# Patient Record
Sex: FEMALE | Race: WHITE | NOT HISPANIC OR LATINO | Employment: OTHER | URBAN - METROPOLITAN AREA
[De-identification: names, ages, dates, MRNs, and addresses within clinical notes are randomized per-mention and may not be internally consistent; named-entity substitution may affect disease eponyms.]

---

## 2017-02-28 ENCOUNTER — HISTORICAL (OUTPATIENT)
Dept: LAB | Facility: HOSPITAL | Age: 57
End: 2017-02-28

## 2017-03-06 ENCOUNTER — HISTORICAL (OUTPATIENT)
Dept: ADMINISTRATIVE | Facility: HOSPITAL | Age: 57
End: 2017-03-06

## 2017-07-25 ENCOUNTER — HISTORICAL (OUTPATIENT)
Dept: ENDOSCOPY | Facility: HOSPITAL | Age: 57
End: 2017-07-25

## 2018-07-24 ENCOUNTER — HISTORICAL (OUTPATIENT)
Dept: ENDOSCOPY | Facility: HOSPITAL | Age: 58
End: 2018-07-24

## 2019-08-27 ENCOUNTER — HISTORICAL (OUTPATIENT)
Dept: ENDOSCOPY | Facility: HOSPITAL | Age: 59
End: 2019-08-27

## 2020-12-15 ENCOUNTER — HISTORICAL (OUTPATIENT)
Dept: CARDIOLOGY | Facility: HOSPITAL | Age: 60
End: 2020-12-15

## 2022-04-10 ENCOUNTER — HISTORICAL (OUTPATIENT)
Dept: ADMINISTRATIVE | Facility: HOSPITAL | Age: 62
End: 2022-04-10

## 2022-04-28 VITALS
WEIGHT: 173.31 LBS | BODY MASS INDEX: 30.71 KG/M2 | SYSTOLIC BLOOD PRESSURE: 138 MMHG | DIASTOLIC BLOOD PRESSURE: 80 MMHG | HEIGHT: 63 IN | OXYGEN SATURATION: 98 %

## 2022-04-30 NOTE — OP NOTE
DATE OF SURGERY:    07/25/2017    SURGEON:  Sid Resendez MD    attending physician:  Sid Resendez MD    PREOPERATIVE DIAGNOSIS:  History of transitional cell carcinoma of the bladder.    POSTOPERATIVE DIAGNOSIS:  History of transitional cell carcinoma of the bladder.    PROCEDURE:  Cystoscopy.    ANESTHESIA:  None.    BLOOD LOSS:  None.    COMPLICATIONS:  None.    FINDINGS:  Scar right posterior wall just off the trigone.  No evidence recurrence.    INDICATION:  A 57-year-old female with a history of transitional cell carcinoma, superficial T1 disease of her bladder.  She has not had any recurrences.  She did not receive BCG.  She comes back in for surveillance.    DESCRIPTION OF PROCEDURE:  A FISH specimen was submitted before the procedure.  She was then placed in dorsal lithotomy, prepped with Betadine.  Viscous lidocaine instilled into the urethra.  Flexible scope was inserted.  Urethra was unremarkable.  Looking in the bladder, ureteral orifices normal size, shape, and position.  There is some scarring off the right trigone from her initial biopsy site.  No evidence of any recurrent disease.  No areas of erythema or abnormal mucosa noted.  Dome and bladder neck were all viewed and free of lesions.      ASSESSMENT:  History of transitional cell carcinoma with no evidence recurrence.  I will see her back in 1 year for surveillance cystoscopy, sooner for problems.        ______________________________  Sid Resendez MD    TAB/MODL  DD:  07/25/2017  Time:  11:44AM  DT:  07/25/2017  Time:  12:03PM  Job #:  [id number]

## 2022-04-30 NOTE — OP NOTE
UHDS (Unverified)  DISCHARGE DATE:  07/24/2018    PREOPERATIVE DIAGNOSIS:  History of transitional cell carcinoma of the bladder.    POSTOPERATIVE DIAGNOSIS:  History of transitional cell carcinoma of the bladder.    PROCEDURE:  Cystoscopy.    ESTIMATED BLOOD LOSS:  None.    COMPLICATIONS:  None.    FINDINGS:  No evidence of recurrence.    INDICATIONS FOR PROCEDURE:  A 58-year-old female, history of transitional cell carcinoma.  She had superficial T1 disease.  She has not had any recurrences and is currently on a yearly surveillance cycle.  Of note, she never received BCG and has not had any recurrences.    DESCRIPTION OF PROCEDURE:  She was taken to the cystoscopy suite.  She was placed in dorsal lithotomy and prepped with Betadine.  Viscous lidocaine was instilled into the urethra.  Flexible scope was inserted.  Urethra was unremarkable.  Looking in the bladder, ureteral orifices were normal size, shape, and position. Clear efflux bilaterally.  No tumor, stones, or foreign bodies seen.  Dome and bladder neck viewed and free of lesions.  She did have lateral cystoceles.  Otherwise unremarkable examination with no evidence of recurrence.  I am going to see her back in 1 year for a surveillance cystoscopy, sooner should she have any problems.        ______________________________  Sid Resendez MD    TAB/MODL  DD:  07/24/2018  Time:  11:38AM  DT:  07/24/2018  Time:  11:46AM  Job #:  [id number]       Result type: Discharge Summary  Result date: July 24, 2018 11:46 CDT  Result status: Unauth  Result title: UHDS  Performed by: Sid Resendez MD on July 24, 2018 11:38 CDT  Encounter info: [id number], [facility], Day Surgery, 7/24/2018 - 7/24/2018  Contributor system: [employer]    Doc has been moved by HIM specialist to the correct doc type.

## 2022-04-30 NOTE — OP NOTE
DATE OF SURGERY:        SURGEON:  Sid Resendez MD    attending physician:  Dr. Sid Resendez MD    PREOPERATIVE DIAGNOSIS:  History of transitional cell carcinoma of the bladder.    POSTOPERATIVE DIAGNOSIS:  History of transitional cell carcinoma of the bladder.    PROCEDURE:  Cystoscopy.    COMPLICATIONS:  None.    BLOOD LOSS:  None.    FINDINGS:  No evidence of recurrent disease.    INDICATIONS:  A 59-year-old female with history of transitional carcinoma.  Initially, she had a T1 lesion.  She was managed with surveillance, currently on yearly surveillance regime.  She denies any hematuria or voiding issues.  An FISH test was submitted before the procedure.    PROCEDURE IN DETAIL:  Informed consent was obtained.  She was placed in dorsal lithotomy position, prepped with Betadine.  Viscous lidocaine instilled in the urethra.  Flexible scope was inserted.  There were no tumor, stones or foreign bodies seen.  Ureteral orifices normal size, shape, position, clear efflux bilaterally.  Dome and bladder neck reviewed and free of lesions.  She has cystoceles bilateral, otherwise no significant findings.  No evidence of recurrence.  I will see her back in a year for surveillance cystoscopy, sooner should she have any problems.        ______________________________  Sid Resendez MD    TAB/MODL  DD:  09/03/2019  Time:  11:16AM  DT:  09/03/2019  Time:  11:22AM  Job #:  [id number]

## 2022-11-21 ENCOUNTER — OFFICE VISIT (OUTPATIENT)
Dept: UROLOGY | Facility: CLINIC | Age: 62
End: 2022-11-21
Payer: MEDICAID

## 2022-11-21 VITALS
WEIGHT: 168.63 LBS | TEMPERATURE: 98 F | SYSTOLIC BLOOD PRESSURE: 118 MMHG | DIASTOLIC BLOOD PRESSURE: 78 MMHG | HEART RATE: 72 BPM | RESPIRATION RATE: 20 BRPM | HEIGHT: 64 IN | OXYGEN SATURATION: 100 % | BODY MASS INDEX: 28.79 KG/M2

## 2022-11-21 DIAGNOSIS — Z85.51 HISTORY OF BLADDER CANCER: Primary | ICD-10-CM

## 2022-11-21 PROCEDURE — 3078F PR MOST RECENT DIASTOLIC BLOOD PRESSURE < 80 MM HG: ICD-10-PCS | Mod: CPTII,,, | Performed by: UROLOGY

## 2022-11-21 PROCEDURE — 3074F SYST BP LT 130 MM HG: CPT | Mod: CPTII,,, | Performed by: UROLOGY

## 2022-11-21 PROCEDURE — 3008F BODY MASS INDEX DOCD: CPT | Mod: CPTII,,, | Performed by: UROLOGY

## 2022-11-21 PROCEDURE — 3074F PR MOST RECENT SYSTOLIC BLOOD PRESSURE < 130 MM HG: ICD-10-PCS | Mod: CPTII,,, | Performed by: UROLOGY

## 2022-11-21 PROCEDURE — 1159F MED LIST DOCD IN RCRD: CPT | Mod: CPTII,,, | Performed by: UROLOGY

## 2022-11-21 PROCEDURE — 99213 PR OFFICE/OUTPT VISIT, EST, LEVL III, 20-29 MIN: ICD-10-PCS | Mod: S$PBB,,, | Performed by: UROLOGY

## 2022-11-21 PROCEDURE — 1160F RVW MEDS BY RX/DR IN RCRD: CPT | Mod: CPTII,,, | Performed by: UROLOGY

## 2022-11-21 PROCEDURE — 3078F DIAST BP <80 MM HG: CPT | Mod: CPTII,,, | Performed by: UROLOGY

## 2022-11-21 PROCEDURE — 1160F PR REVIEW ALL MEDS BY PRESCRIBER/CLIN PHARMACIST DOCUMENTED: ICD-10-PCS | Mod: CPTII,,, | Performed by: UROLOGY

## 2022-11-21 PROCEDURE — 99214 OFFICE O/P EST MOD 30 MIN: CPT | Mod: PBBFAC | Performed by: UROLOGY

## 2022-11-21 PROCEDURE — 1159F PR MEDICATION LIST DOCUMENTED IN MEDICAL RECORD: ICD-10-PCS | Mod: CPTII,,, | Performed by: UROLOGY

## 2022-11-21 PROCEDURE — 88108 CYTOPATH CONCENTRATE TECH: CPT | Performed by: UROLOGY

## 2022-11-21 PROCEDURE — 99213 OFFICE O/P EST LOW 20 MIN: CPT | Mod: S$PBB,,, | Performed by: UROLOGY

## 2022-11-21 PROCEDURE — 3008F PR BODY MASS INDEX (BMI) DOCUMENTED: ICD-10-PCS | Mod: CPTII,,, | Performed by: UROLOGY

## 2022-11-21 RX ORDER — GABAPENTIN 600 MG/1
900 TABLET ORAL 3 TIMES DAILY
COMMUNITY

## 2022-11-21 RX ORDER — INSULIN GLARGINE 100 [IU]/ML
INJECTION, SOLUTION SUBCUTANEOUS NIGHTLY
COMMUNITY

## 2022-11-21 RX ORDER — LATANOPROST 50 UG/ML
1 SOLUTION/ DROPS OPHTHALMIC NIGHTLY
COMMUNITY

## 2022-11-21 RX ORDER — DICYCLOMINE HYDROCHLORIDE 20 MG/1
20 TABLET ORAL EVERY 6 HOURS
COMMUNITY

## 2022-11-21 RX ORDER — PEN NEEDLE, DIABETIC, SAFETY 30 GX3/16"
NEEDLE, DISPOSABLE MISCELLANEOUS
COMMUNITY

## 2022-11-21 RX ORDER — PRAVASTATIN SODIUM 20 MG/1
40 TABLET ORAL NIGHTLY
COMMUNITY

## 2022-11-21 RX ORDER — DOCUSATE SODIUM 250 MG
250 CAPSULE ORAL DAILY
COMMUNITY

## 2022-11-21 RX ORDER — AMITRIPTYLINE HYDROCHLORIDE 25 MG/1
25 TABLET, FILM COATED ORAL NIGHTLY
COMMUNITY

## 2022-11-21 RX ORDER — BUDESONIDE AND FORMOTEROL FUMARATE DIHYDRATE 80; 4.5 UG/1; UG/1
2 AEROSOL RESPIRATORY (INHALATION)
COMMUNITY

## 2022-11-21 RX ORDER — ACETAMINOPHEN AND CODEINE PHOSPHATE 300; 60 MG/1; MG/1
1 TABLET ORAL 3 TIMES DAILY
COMMUNITY
Start: 2022-09-13

## 2022-11-21 RX ORDER — CYCLOSPORINE 0.5 MG/ML
1 EMULSION OPHTHALMIC 2 TIMES DAILY
COMMUNITY

## 2022-11-21 RX ORDER — OXYBUTYNIN CHLORIDE 5 MG/1
5 TABLET, EXTENDED RELEASE ORAL
COMMUNITY

## 2022-11-21 RX ORDER — AMLODIPINE BESYLATE 10 MG/1
10 TABLET ORAL
COMMUNITY

## 2022-11-21 RX ORDER — PEN NEEDLE, DIABETIC 30 GX3/16"
NEEDLE, DISPOSABLE MISCELLANEOUS
COMMUNITY
Start: 2021-12-16

## 2022-11-21 RX ORDER — INSULIN ASPART 100 [IU]/ML
INJECTION, SOLUTION INTRAVENOUS; SUBCUTANEOUS
COMMUNITY

## 2022-11-21 RX ORDER — DULOXETIN HYDROCHLORIDE 60 MG/1
60 CAPSULE, DELAYED RELEASE ORAL
COMMUNITY

## 2022-11-21 RX ORDER — PANTOPRAZOLE SODIUM 40 MG/1
40 TABLET, DELAYED RELEASE ORAL DAILY
COMMUNITY

## 2022-11-21 RX ORDER — GLIMEPIRIDE 4 MG/1
8 TABLET ORAL
COMMUNITY

## 2022-11-21 RX ORDER — LUBIPROSTONE 24 UG/1
24 CAPSULE ORAL 2 TIMES DAILY
COMMUNITY

## 2022-11-21 RX ORDER — SIMVASTATIN 20 MG/1
20 TABLET, FILM COATED ORAL NIGHTLY
COMMUNITY

## 2022-11-21 RX ORDER — FLUTICASONE PROPIONATE 50 MCG
1 SPRAY, SUSPENSION (ML) NASAL NIGHTLY
COMMUNITY

## 2022-11-21 RX ORDER — FAMOTIDINE 40 MG/1
40 TABLET, FILM COATED ORAL NIGHTLY PRN
COMMUNITY

## 2022-11-21 NOTE — PROGRESS NOTES
CC:  Yearly    HPI:  Lina Bobo is a 62 y.o. female here for follow-up of bladder cancer.  She has a history of a TURBT on 13 June 2014 with Dr. Samuel Doty in [place].  The pathology showed noninvasive low-grade papillary urothelial carcinoma.  On that specimen there was no muscle present so a repeat biopsy was done which showed a small area of noninvasive low-grade papillary urothelial carcinoma.  There was no cancer in the muscle.  She had a cystoscopy in November of 2021 and that was negative.  She has no urinary complaints today.  She has not had upper tract imaging.    Pathology:  See HPI.    Data Review:  Operative note from Dr. Samuel Doty dated 13 June 2014.  A second note from the same source dated 3 October 2014.  Pathology report as above.  ROS:  All systems reviewed and are negative except as documented in HPI and/or Assessment and Plan.     Patient Active Problem List:     There is no problem list on file for this patient.       Past Medical History:  History reviewed. No pertinent past medical history.     Past Surgical History:  History reviewed. No pertinent surgical history.     Family History:  History reviewed. No pertinent family history.     Social History:  Social History     Socioeconomic History    Marital status: Single   Tobacco Use    Smoking status: Some Days     Packs/day: 0.50     Types: Cigarettes     Passive exposure: Past    Smokeless tobacco: Never        Allergies:  Review of patient's allergies indicates:   Allergen Reactions    Lisinopril     Pcn [penicillins]     Sulfa (sulfonamide antibiotics)     Toradol [ketorolac]     Ultram [tramadol]         Objective:  Vitals:    11/21/22 1455   BP: 118/78   Pulse: 72   Resp: 20   Temp: 98.2 °F (36.8 °C)     General:  Well developed, well nourished adult female in no acute distress  Abdomen: Soft, nontender, no masses  Extremities:  No clubbing, cyanosis, or edema  Neurologic:  Grossly intact  Musculoskeletal:  Normal  tone    Assessment:  1. History of bladder cancer  - Cytology, Urine  - CT Abdomen With Without Contrast; Future  - Creatinine, serum; Future     Plan:  Urine cytology sent today.  Will get a CT urogram for upper tract imaging.  Schedule for cystoscopy.    Follow-up:    For cystoscopy after CT urogram.

## 2022-11-21 NOTE — PROGRESS NOTES
Placed in room. Seen by Dr. Lamb. Spoke with patient. Will obtain urine and send to cytology. RTC next available cysto.

## 2022-11-22 ENCOUNTER — LAB VISIT (OUTPATIENT)
Dept: LAB | Facility: HOSPITAL | Age: 62
End: 2022-11-22
Attending: FAMILY MEDICINE
Payer: MEDICAID

## 2022-11-22 DIAGNOSIS — Z85.51 HISTORY OF BLADDER CANCER: ICD-10-CM

## 2022-11-22 PROCEDURE — 36415 COLL VENOUS BLD VENIPUNCTURE: CPT

## 2022-11-23 LAB
ESTROGEN SERPL-MCNC: NORMAL PG/ML
INSULIN SERPL-ACNC: NORMAL U[IU]/ML
LAB AP CLINICAL INFORMATION: NORMAL
LAB AP GROSS DESCRIPTION: NORMAL

## 2022-12-20 ENCOUNTER — HOSPITAL ENCOUNTER (OUTPATIENT)
Dept: RADIOLOGY | Facility: HOSPITAL | Age: 62
Discharge: HOME | End: 2022-12-20
Attending: UROLOGY
Payer: MEDICAID

## 2022-12-20 DIAGNOSIS — Z85.51 HISTORY OF BLADDER CANCER: ICD-10-CM

## 2022-12-20 LAB
CREAT SERPL-MCNC: 1.04 MG/DL (ref 0.55–1.02)
GFR SERPLBLD CREATININE-BSD FMLA CKD-EPI: >60 MLS/MIN/1.73/M2

## 2022-12-20 PROCEDURE — 82565 ASSAY OF CREATININE: CPT | Performed by: UROLOGY

## 2022-12-20 PROCEDURE — 74178 CT ABD&PLV WO CNTR FLWD CNTR: CPT | Mod: TC

## 2022-12-20 PROCEDURE — 25500020 PHARM REV CODE 255: Performed by: UROLOGY

## 2022-12-20 RX ADMIN — IOPAMIDOL 100 ML: 755 INJECTION, SOLUTION INTRAVENOUS at 10:12

## 2023-06-12 ENCOUNTER — PROCEDURE VISIT (OUTPATIENT)
Dept: UROLOGY | Facility: CLINIC | Age: 63
End: 2023-06-12
Payer: MEDICAID

## 2023-06-12 VITALS
RESPIRATION RATE: 20 BRPM | HEIGHT: 63 IN | BODY MASS INDEX: 30.72 KG/M2 | TEMPERATURE: 98 F | WEIGHT: 173.38 LBS | SYSTOLIC BLOOD PRESSURE: 135 MMHG | DIASTOLIC BLOOD PRESSURE: 83 MMHG | HEART RATE: 99 BPM | OXYGEN SATURATION: 95 %

## 2023-06-12 DIAGNOSIS — Z85.51 HISTORY OF BLADDER CANCER: Primary | ICD-10-CM

## 2023-06-12 DIAGNOSIS — N28.1 RENAL CYST: ICD-10-CM

## 2023-06-12 LAB
BILIRUB SERPL-MCNC: NORMAL MG/DL
BLOOD URINE, POC: NORMAL
COLOR, POC UA: NORMAL
GLUCOSE UR QL STRIP: NORMAL
KETONES UR QL STRIP: NORMAL
LEUKOCYTE ESTERASE URINE, POC: NORMAL
NITRITE, POC UA: NORMAL
PH, POC UA: 6.5
PROTEIN, POC: NORMAL
SPECIFIC GRAVITY, POC UA: 1.01
UROBILINOGEN, POC UA: 0.2

## 2023-06-12 PROCEDURE — 99212 PR OFFICE/OUTPT VISIT, EST, LEVL II, 10-19 MIN: ICD-10-PCS | Mod: 25,S$PBB,, | Performed by: UROLOGY

## 2023-06-12 PROCEDURE — 88108 CYTOPATH CONCENTRATE TECH: CPT | Mod: TC | Performed by: UROLOGY

## 2023-06-12 PROCEDURE — 52000 CYSTOURETHROSCOPY: CPT | Mod: PBBFAC | Performed by: UROLOGY

## 2023-06-12 PROCEDURE — 52000 CYSTOURETHROSCOPY: CPT | Mod: S$PBB,,, | Performed by: UROLOGY

## 2023-06-12 PROCEDURE — 52000 PR CYSTOURETHROSCOPY: ICD-10-PCS | Mod: S$PBB,,, | Performed by: UROLOGY

## 2023-06-12 PROCEDURE — 81001 URINALYSIS AUTO W/SCOPE: CPT | Mod: PBBFAC | Performed by: UROLOGY

## 2023-06-12 PROCEDURE — 99212 OFFICE O/P EST SF 10 MIN: CPT | Mod: 25,S$PBB,, | Performed by: UROLOGY

## 2023-06-12 RX ORDER — DULAGLUTIDE 1.5 MG/.5ML
1.5 INJECTION, SOLUTION SUBCUTANEOUS WEEKLY
COMMUNITY
Start: 2023-06-02

## 2023-06-12 RX ORDER — CIPROFLOXACIN 500 MG/1
500 TABLET ORAL
Status: COMPLETED | OUTPATIENT
Start: 2023-06-12 | End: 2023-06-12

## 2023-06-12 RX ORDER — LIDOCAINE HYDROCHLORIDE 20 MG/ML
JELLY TOPICAL
Status: COMPLETED | OUTPATIENT
Start: 2023-06-12 | End: 2023-06-12

## 2023-06-12 RX ADMIN — LIDOCAINE HYDROCHLORIDE: 20 JELLY TOPICAL at 01:06

## 2023-06-12 RX ADMIN — CIPROFLOXACIN 500 MG: 500 TABLET ORAL at 01:06

## 2023-06-12 NOTE — PROCEDURES
CC:  Cystoscopy    HPI:  Lina Bobo is a 62 y.o. female here for cystoscopy for history of bladder cancer.  She has a history of a TURBT on 2014 with Dr. Samuel Doty in [place].  The pathology showed noninvasive low-grade papillary urothelial carcinoma.  On that specimen there was no muscle present so a repeat biopsy was done which showed a small area of noninvasive low-grade papillary urothelial carcinoma.  There was no cancer in the muscle.  She had a cystoscopy in 2021 and that was negative and this will be her first cystoscopy since then.    Urinalysis:  Results for orders placed or performed in visit on 23   POCT URINE DIPSTICK WITH MICROSCOPE, AUTOMATED   Result Value Ref Range    Color, UA Light Yellow     Spec Grav UA 1.015     pH, UA 6.5     WBC, UA neg     Nitrite, UA neg     Protein, POC neg     Glucose, UA neg     Ketones, UA neg     Urobilinogen, UA 0.2     Bilirubin, POC neg     Blood, UA neg      Microscopic:  Negative    Lab Results:  Cytology - 2022: Negative    Imaging:  CT - 2022:    -  Cortical scarring in the upper pole of the left kidney which stable from the previous imaging.    -  Bilateral simple cysts.       ROS:  All systems reviewed and are negative except as documented in HPI and/or Assessment and Plan.     Patient Active Problem List:     There is no problem list on file for this patient.       Past Medical History:  Past Medical History:   Diagnosis Date    Bladder cancer     Diabetes mellitus     Heartburn     Hypertension     Mixed hyperlipidemia         Past Surgical History:  Past Surgical History:   Procedure Laterality Date    CARPAL TUNNEL RELEASE Bilateral      SECTION      x 2    HYSTERECTOMY          Family History:  History reviewed. No pertinent family history.     Social History:  Social History     Socioeconomic History    Marital status: Single   Tobacco Use    Smoking status: Every Day     Packs/day:  0.50     Types: Cigarettes     Passive exposure: Past    Smokeless tobacco: Never   Substance and Sexual Activity    Alcohol use: Not Currently    Drug use: Not Currently        Allergies:  Review of patient's allergies indicates:   Allergen Reactions    Lisinopril     Pcn [penicillins]     Sulfa (sulfonamide antibiotics)     Toradol [ketorolac]     Ultram [tramadol]         Objective:  Vitals:    06/12/23 1254   BP: 135/83   Pulse: 99   Resp: 20   Temp: 98.3 °F (36.8 °C)     General:  Well developed, well nourished adult female in no acute distress  Abdomen: Soft, nontender, no masses  Extremities:  No clubbing, cyanosis, or edema  Neurologic:  Grossly intact  Musculoskeletal:  Normal tone  :  External genitalia is normal without lesions.  Vagina is normal.      Cystoscopy:        - Urethral meatus:  No strictures        - Urethra:  Normal without strictures or lesions        - Bladder neck:  Normal        - Bladder:  No mucosal abnormalities.  Scarring is seen around the left ureteral orifice consistent with previous TURBT.        - Ureteral orifices:  On the trigone with clear efflux bilaterally    The patient tolerated the procedure well without complications.  She was given Cipro 500mg, one tablet in the clinic.   The urethra was anesthetized with 2% Lidocaine Jelly, Urojet.      Assessment:  1. History of bladder cancer  - POCT URINE DIPSTICK WITH MICROSCOPE, AUTOMATED  - Cytology, Urine    2. Renal cyst     Plan:  We will send a urine cytology today as it has been six months since the last one.  Observation of the simple cysts.  These were explained to the patient.    Follow-up:    One year for a cystoscopy and cytology.

## 2023-06-12 NOTE — PROGRESS NOTES
Pt seen by Dr. Cox; Urine collected & sent to lab; Pt consented & premedicated for procedure; Time out documentation completed; Cystoscopy performed w/no complications; Pt instructed to return to clinic in 1 year for cystoscopy; Discharge paperwork given w/pt verbalizing understanding

## 2023-06-13 ENCOUNTER — TELEPHONE (OUTPATIENT)
Dept: UROLOGY | Facility: HOSPITAL | Age: 63
End: 2023-06-13
Payer: MEDICAID

## 2024-06-10 ENCOUNTER — PROCEDURE VISIT (OUTPATIENT)
Dept: UROLOGY | Facility: CLINIC | Age: 64
End: 2024-06-10
Payer: MEDICAID

## 2024-06-10 VITALS
WEIGHT: 173 LBS | BODY MASS INDEX: 30.65 KG/M2 | RESPIRATION RATE: 18 BRPM | OXYGEN SATURATION: 98 % | SYSTOLIC BLOOD PRESSURE: 132 MMHG | HEART RATE: 96 BPM | DIASTOLIC BLOOD PRESSURE: 72 MMHG | HEIGHT: 63 IN

## 2024-06-10 DIAGNOSIS — Z85.51 HISTORY OF BLADDER CANCER: Primary | ICD-10-CM

## 2024-06-10 LAB
BILIRUB SERPL-MCNC: NORMAL MG/DL
BLOOD URINE, POC: NORMAL
COLOR, POC UA: YELLOW
GLUCOSE UR QL STRIP: 500
KETONES UR QL STRIP: NORMAL
LEUKOCYTE ESTERASE URINE, POC: NORMAL
NITRITE, POC UA: NORMAL
PH, POC UA: 6
PROTEIN, POC: NORMAL
SPECIFIC GRAVITY, POC UA: 1.01
UROBILINOGEN, POC UA: 0.2

## 2024-06-10 PROCEDURE — 81001 URINALYSIS AUTO W/SCOPE: CPT | Mod: PBBFAC | Performed by: UROLOGY

## 2024-06-10 PROCEDURE — 88108 CYTOPATH CONCENTRATE TECH: CPT | Mod: TC | Performed by: UROLOGY

## 2024-06-10 PROCEDURE — 52000 CYSTOURETHROSCOPY: CPT | Mod: S$PBB,,, | Performed by: UROLOGY

## 2024-06-10 PROCEDURE — 52000 CYSTOURETHROSCOPY: CPT | Mod: PBBFAC | Performed by: UROLOGY

## 2024-06-10 RX ORDER — DULAGLUTIDE 0.75 MG/.5ML
INJECTION, SOLUTION SUBCUTANEOUS
COMMUNITY
Start: 2024-04-03

## 2024-06-10 RX ORDER — TIMOLOL MALEATE 5 MG/ML
SOLUTION/ DROPS OPHTHALMIC
COMMUNITY

## 2024-06-10 RX ORDER — LIDOCAINE HYDROCHLORIDE 20 MG/ML
JELLY TOPICAL
Status: COMPLETED | OUTPATIENT
Start: 2024-06-10 | End: 2024-06-10

## 2024-06-10 RX ORDER — ROSUVASTATIN CALCIUM 20 MG/1
20 TABLET, COATED ORAL NIGHTLY
COMMUNITY
Start: 2024-06-03

## 2024-06-10 RX ORDER — CYCLOBENZAPRINE HCL 5 MG
5 TABLET ORAL
COMMUNITY

## 2024-06-10 RX ORDER — CIPROFLOXACIN 500 MG/1
500 TABLET ORAL
Status: COMPLETED | OUTPATIENT
Start: 2024-06-10 | End: 2024-06-10

## 2024-06-10 RX ADMIN — CIPROFLOXACIN 500 MG: 500 TABLET ORAL at 09:06

## 2024-06-10 RX ADMIN — LIDOCAINE HYDROCHLORIDE: 20 JELLY TOPICAL at 09:06

## 2024-06-10 NOTE — PROCEDURES
CC:  Cystoscopy    HPI:  Lina Bobo is a 63 y.o. female here for cystoscopy for bladder cancer.  She has a history of a TURBT on 2014 with Dr. Samuel Doty in [place].  The pathology showed noninvasive low-grade papillary urothelial carcinoma.  On that specimen there was no muscle present so a repeat biopsy was done which showed a small area of noninvasive low-grade papillary urothelial carcinoma.  There was no cancer in the muscle.  She has not had a recurrence.  Her last cystoscopy was one year ago.    Urinalysis:  Results for orders placed or performed in visit on 06/10/24   POCT URINE DIPSTICK WITH MICROSCOPE, AUTOMATED   Result Value Ref Range    Color, UA Yellow     Spec Grav UA 1.010     pH, UA 6.0     WBC, UA neg     Nitrite, UA neg     Protein, POC neg     Glucose,      Ketones, UA neg     Urobilinogen, UA 0.2     Bilirubin, POC neg     Blood, UA neg        Microscopic Urinalysis:  WBC:   None per HPF     RBC:    None per HPF     Bacteria:    None per HPF     Squamous epithelial cells:    None per HPF  Crystals:   None     ROS:  All systems reviewed and are negative except as documented in HPI and/or Assessment and Plan.     Patient Active Problem List:     There is no problem list on file for this patient.       Past Medical History:  Past Medical History:   Diagnosis Date    Bladder cancer     Diabetes mellitus     Heartburn     Hypertension     Mixed hyperlipidemia         Past Surgical History:  Past Surgical History:   Procedure Laterality Date    CARPAL TUNNEL RELEASE Bilateral      SECTION      x 2    HYSTERECTOMY          Family History:  History reviewed. No pertinent family history.     Social History:  Social History     Socioeconomic History    Marital status: Single   Tobacco Use    Smoking status: Every Day     Current packs/day: 0.50     Types: Cigarettes     Passive exposure: Past    Smokeless tobacco: Never   Substance and Sexual Activity    Alcohol use: Not  Currently    Drug use: Not Currently     Social Determinants of Health     Financial Resource Strain: Low Risk  (2/21/2024)    [facility]    Overall Financial Resource Strain (CARDIA)     Difficulty of Paying Living Expenses: Not hard at all   Food Insecurity: No Food Insecurity (2/21/2024)    [facility]    Hunger Vital Sign     Worried About Running Out of Food in the Last Year: Never true     Ran Out of Food in the Last Year: Never true   Transportation Needs: Unmet Transportation Needs (2/21/2024)    [facility]    PRAPARE - Transportation     Lack of Transportation (Medical): Yes     Lack of Transportation (Non-Medical): Yes   Physical Activity: Inactive (9/28/2019)    [facility]    Exercise Vital Sign     Days of Exercise per Week: 0 days     Minutes of Exercise per Session: 0 min   Stress: No Stress Concern Present (9/28/2019)    [facility]    [place] of Occupational Health - Occupational Stress Questionnaire     Feeling of Stress : Only a little        Allergies:  Review of patient's allergies indicates:   Allergen Reactions    Lisinopril     Pcn [penicillins]     Sulfa (sulfonamide antibiotics)     Toradol [ketorolac]     Ultram [tramadol]         Objective:  Vitals:    06/10/24 0850   BP: 132/72   Pulse: 96   Resp: 18     General:  Well developed, well nourished adult female in no acute distress  Abdomen: Soft, nontender, no masses  Extremities:  No clubbing, cyanosis, or edema  Neurologic:  Grossly intact  Musculoskeletal:  Normal tone  :  External genitalia  is normal without lesions.  Vagina is normal.      Cystoscopy:         - Urethral meatus:  No strictures        - Urethra:  Normal without strictures or lesions        - Bladder neck:  Normal        - Bladder:  No mucosal abnormalities        - Ureteral orifices:  On the trigone with clear efflux bilaterally    The patient tolerated the procedure well without complications.  She was given Cipro 500mg, one tablet in the clinic.   The urethra was anesthetized with 2% Lidocaine Jelly, Urojet.      Assessment:  1. History of bladder cancer  - POCT URINE DIPSTICK WITH MICROSCOPE, AUTOMATED     Plan:  Cystoscopy today is negative.  Urine cytology was sent.    Follow-up:    One year for cystoscopy and cytology.

## 2024-06-11 LAB
ESTROGEN SERPL-MCNC: NORMAL PG/ML
INSULIN SERPL-ACNC: NORMAL U[IU]/ML
LAB AP CLINICAL INFORMATION: NORMAL
LAB AP GROSS DESCRIPTION: NORMAL
LAB AP URINE CYTOLOGY INTERPRETATION SPECIMEN 1: NORMAL

## 2025-06-09 ENCOUNTER — PROCEDURE VISIT (OUTPATIENT)
Dept: UROLOGY | Facility: CLINIC | Age: 65
End: 2025-06-09
Payer: MEDICAID

## 2025-06-09 VITALS
WEIGHT: 165.38 LBS | RESPIRATION RATE: 20 BRPM | DIASTOLIC BLOOD PRESSURE: 79 MMHG | BODY MASS INDEX: 29.3 KG/M2 | SYSTOLIC BLOOD PRESSURE: 136 MMHG | TEMPERATURE: 99 F | HEART RATE: 91 BPM | OXYGEN SATURATION: 97 % | HEIGHT: 63 IN

## 2025-06-09 DIAGNOSIS — R39.11 URINARY HESITANCY: ICD-10-CM

## 2025-06-09 DIAGNOSIS — Z85.51 HISTORY OF BLADDER CANCER: Primary | ICD-10-CM

## 2025-06-09 LAB
BILIRUB SERPL-MCNC: NEGATIVE MG/DL
BLOOD URINE, POC: NEGATIVE
COLOR, POC UA: YELLOW
GLUCOSE UR QL STRIP: 250
KETONES UR QL STRIP: NEGATIVE
LEUKOCYTE ESTERASE URINE, POC: NEGATIVE
NITRITE, POC UA: NEGATIVE
PH, POC UA: 6
PROTEIN, POC: 30
SPECIFIC GRAVITY, POC UA: 1.01
UROBILINOGEN, POC UA: 0.2

## 2025-06-09 PROCEDURE — 52000 CYSTOURETHROSCOPY: CPT | Mod: PBBFAC | Performed by: UROLOGY

## 2025-06-09 PROCEDURE — 88108 CYTOPATH CONCENTRATE TECH: CPT | Mod: TC | Performed by: UROLOGY

## 2025-06-09 PROCEDURE — 99499 UNLISTED E&M SERVICE: CPT | Mod: S$PBB,,, | Performed by: UROLOGY

## 2025-06-09 PROCEDURE — 81001 URINALYSIS AUTO W/SCOPE: CPT | Mod: PBBFAC | Performed by: UROLOGY

## 2025-06-09 PROCEDURE — 52000 CYSTOURETHROSCOPY: CPT | Mod: S$PBB,,, | Performed by: UROLOGY

## 2025-06-09 RX ORDER — CIPROFLOXACIN 500 MG/1
500 TABLET, FILM COATED ORAL
Status: COMPLETED | OUTPATIENT
Start: 2025-06-09 | End: 2025-06-09

## 2025-06-09 RX ORDER — LANOLIN ALCOHOL/MO/W.PET/CERES
1 CREAM (GRAM) TOPICAL DAILY
COMMUNITY
Start: 2025-06-04

## 2025-06-09 RX ORDER — ASPIRIN 325 MG
50000 TABLET, DELAYED RELEASE (ENTERIC COATED) ORAL
COMMUNITY
Start: 2025-06-04

## 2025-06-09 RX ORDER — LIDOCAINE HYDROCHLORIDE 20 MG/ML
JELLY TOPICAL
Status: COMPLETED | OUTPATIENT
Start: 2025-06-09 | End: 2025-06-09

## 2025-06-09 RX ORDER — TIRZEPATIDE 2.5 MG/.5ML
2.5 INJECTION, SOLUTION SUBCUTANEOUS WEEKLY
COMMUNITY
Start: 2025-05-20

## 2025-06-09 RX ADMIN — CIPROFLOXACIN 500 MG: 500 TABLET, FILM COATED ORAL at 10:06

## 2025-06-09 RX ADMIN — LIDOCAINE HYDROCHLORIDE: 20 JELLY TOPICAL at 10:06

## 2025-06-09 NOTE — PROGRESS NOTES
Pt seen by Dr. Cox; Pt consented & premedicated for procedure; Time out documentation completed; Cystoscopy performed w/no complications; Urine collected & sent to lab; Pt instructed to return to clinic in 1 year for cystoscopy; Discharge paperwork given w/pt verbalizing understanding

## 2025-06-09 NOTE — PROCEDURES
CC:  Cystoscopy    HPI:  Lina Bobo is a 64 y.o. female here for cystoscopy for hematuria.  She has a history of a TURBT on 2014 with Dr. Samuel Doty in [place].  The pathology showed noninvasive low-grade papillary urothelial carcinoma.  On that specimen there was no muscle present so a repeat biopsy was done which showed a small area of noninvasive low-grade papillary urothelial carcinoma.  There was no cancer in the muscle.  She had a cystoscopy in 2024 which was negative as was a cytology.  She has not had recent imaging.  She complains of urinary hesitancy.  This is intermittent.  It is not usually problem for her.    Urinalysis:  Results for orders placed or performed in visit on 06/10/24   POCT URINE DIPSTICK WITH MICROSCOPE, AUTOMATED   Result Value Ref Range    Color, UA Yellow     Spec Grav UA 1.010     pH, UA 6.0     WBC, UA neg     Nitrite, UA neg     Protein, POC neg     Glucose,      Ketones, UA neg     Urobilinogen, UA 0.2     Bilirubin, POC neg     Blood, UA neg        Microscopic Urinalysis:  WBC:   None per HPF     RBC:    None per HPF     Bacteria:    None per HPF     Squamous epithelial cells:  None per HPF  Crystals:   None    ROS:  All systems reviewed and are negative except as documented in HPI and/or Assessment and Plan.     Patient Active Problem List:     Problem List[1]     Past Medical History:  Past Medical History:   Diagnosis Date    Bladder cancer     Diabetes mellitus     Heartburn     Hypertension     Mixed hyperlipidemia         Past Surgical History:  Past Surgical History:   Procedure Laterality Date    CARPAL TUNNEL RELEASE Bilateral      SECTION      x 2    HYSTERECTOMY      NOSE SURGERY          Family History:  History reviewed. No pertinent family history.     Social History:  Social History     Socioeconomic History    Marital status: Single   Tobacco Use    Smoking status: Every Day     Current packs/day: 0.50     Types: Cigarettes      Passive exposure: Past    Smokeless tobacco: Never   Substance and Sexual Activity    Alcohol use: Not Currently    Drug use: Not Currently     Social Drivers of Health     Financial Resource Strain: Low Risk  (2/21/2024)    [facility]    Overall Financial Resource Strain (CARDIA)     Difficulty of Paying Living Expenses: Not hard at all   Food Insecurity: No Food Insecurity (2/21/2024)    [facility]    Hunger Vital Sign     Worried About Running Out of Food in the Last Year: Never true     Ran Out of Food in the Last Year: Never true   Transportation Needs: Unmet Transportation Needs (2/21/2024)    Received from [facility]    PRAPARE - Transportation     Lack of Transportation (Medical): Yes     Lack of Transportation (Non-Medical): Yes   Physical Activity: Inactive (9/28/2019)    Received from [facility]    Exercise Vital Sign     Days of Exercise per Week: 0 days     Minutes of Exercise per Session: 0 min   Stress: No Stress Concern Present (9/28/2019)    [facility]    [place] citizen [place] of Occupational Health - Occupational Stress Questionnaire     Feeling of Stress : Only a little   Housing Stability: Unknown (2/21/2024)    [facility]    Housing Stability Vital Sign     Unable to Pay for Housing in the Last Year: No     Number of Places Lived in the Last Year: 1        Allergies:  Review of patient's allergies indicates:   Allergen Reactions    Lisinopril     Pcn [penicillins]     Sulfa (sulfonamide antibiotics)      Toradol [ketorolac]     Ultram [tramadol]         Objective:  Vitals:    06/09/25 1002   BP: 136/79   Pulse: 91   Resp: 20   Temp: 98.6 °F (37 °C)     General:  Well developed, well nourished adult female in no acute distress  Abdomen: Soft, nontender, no masses  Extremities:  No clubbing, cyanosis, or edema  Neurologic:  Grossly intact  Musculoskeletal:  Normal tone  :  External genitalia is normal without lesions.  Vagina is normal.      Cystoscopy:         - Urethral meatus:  No strictures        - Urethra:  Normal without strictures or lesions        - Bladder neck:  Normal        - Bladder:  No mucosal abnormalities        - Ureteral orifices:  On the trigone with clear efflux bilaterally    The patient tolerated the procedure well without complications.  She was given Cipro 500mg, one tablet in the clinic.   The urethra was anesthetized with 2% Lidocaine Jelly, Urojet.      Assessment:  1. History of bladder cancer  - POCT URINE DIPSTICK WITH MICROSCOPE, AUTOMATED  - Cytology, Urine  - Creatinine, serum; Future  - CT Urogram Abd Pelvis W WO; Future    2. Urinary hesitancy     Plan:  Cystoscopy was negative. Urine cytology sent today.  She needs upper tract imaging so a CT Urogram was ordered.  Return in one year for cystoscopy and cytology.    This is not bothersome enough to her to want to pursue workup and treatment at this time.  She will call if that changes.  We will do a bladder scan at the next visit    Follow-up tests needed:   None     Return appointment:  One year for cystoscopy, cytology, and bladder scan.               [1] There is no problem list on file for this patient.

## 2025-06-17 ENCOUNTER — HOSPITAL ENCOUNTER (OUTPATIENT)
Dept: RADIOLOGY | Facility: HOSPITAL | Age: 65
Discharge: HOME | End: 2025-06-17
Attending: UROLOGY
Payer: MEDICAID

## 2025-06-17 DIAGNOSIS — Z85.51 HISTORY OF BLADDER CANCER: ICD-10-CM

## 2025-06-17 LAB
CREAT SERPL-MCNC: 1.01 MG/DL (ref 0.55–1.02)
GFR SERPLBLD CREATININE-BSD FMLA CKD-EPI: >60 ML/MIN/1.73/M2

## 2025-06-17 PROCEDURE — 74178 CT ABD&PLV WO CNTR FLWD CNTR: CPT | Mod: TC

## 2025-06-17 PROCEDURE — 25500020 PHARM REV CODE 255: Performed by: UROLOGY

## 2025-06-17 PROCEDURE — 82565 ASSAY OF CREATININE: CPT | Performed by: UROLOGY

## 2025-06-17 RX ADMIN — IOHEXOL 100 ML: 350 INJECTION, SOLUTION INTRAVENOUS at 10:06

## 2025-06-18 ENCOUNTER — TELEPHONE (OUTPATIENT)
Dept: UROLOGY | Facility: CLINIC | Age: 65
End: 2025-06-18
Payer: MEDICAID

## 2025-06-18 NOTE — TELEPHONE ENCOUNTER
CT scan was negative with the exception of benign renal cysts.  Please inform her of these results.